# Patient Record
Sex: FEMALE
[De-identification: names, ages, dates, MRNs, and addresses within clinical notes are randomized per-mention and may not be internally consistent; named-entity substitution may affect disease eponyms.]

---

## 2022-01-25 ENCOUNTER — NURSE TRIAGE (OUTPATIENT)
Dept: OTHER | Facility: CLINIC | Age: 62
End: 2022-01-25

## 2022-01-25 NOTE — TELEPHONE ENCOUNTER
Subjective: Caller states \"sinus\"     Current Symptoms: runny nose sinus pressure sl cough    Onset: 3-4 days    Associated Symptoms: NA    Pain Severity: mild    Temperature: none    What has been tried: cold meds    LMP: NA Pregnant: NA    Recommended disposition: see pcp in 1-2 days    Care advice provided, patient verbalizes understanding; denies any other questions or concerns; instructed to call back for any new or worsening symptoms. Patient/caller to follow-up with PCP 1-2 days    This triage is a result of a call to [address] [place] [address]. Please do not respond to the triage nurse through this encounter. Any subsequent communication should be directly with the patient.           Reason for Disposition   Sinus congestion (pressure, fullness) present > 10 days    Protocols used: SINUS PAIN OR CONGESTION-ADULT-[place]